# Patient Record
(demographics unavailable — no encounter records)

---

## 2025-03-19 NOTE — PHYSICAL EXAM
[Alert] : alert [Oriented x 3] : ~L oriented x 3 [Well Nourished] : well nourished [FreeTextEntry3] : Erythematous papules, face - inferiorly > superiorly.

## 2025-03-19 NOTE — ASSESSMENT
[FreeTextEntry1] : Acne education. tretinoin qhs Restart benzaclin qd Glyderm mask Oil free preps f/u in 3 months.

## 2025-07-01 NOTE — PHYSICAL EXAM
[Alert] : alert [Oriented x 3] : ~L oriented x 3 [Well Nourished] : well nourished [FreeTextEntry3] : Erythematous papules, moderate ice-pick scarring of the cheeks, malar region. hyperpigmented papule of the upper back.  Fine skin tags, bilateral neck.

## 2025-07-01 NOTE — ASSESSMENT
[FreeTextEntry1] : Acne Much improved. continue tretinoin qhs and benzaclin qd. for back, use tretinoin qhs and BPO 5% cleanser qAM. Glyderm mask for face weekly. Oil free preps.  Skin tags, neck. Benign. If growth, will consider removal.